# Patient Record
Sex: FEMALE | Race: WHITE | NOT HISPANIC OR LATINO
[De-identification: names, ages, dates, MRNs, and addresses within clinical notes are randomized per-mention and may not be internally consistent; named-entity substitution may affect disease eponyms.]

---

## 2017-02-16 ENCOUNTER — RESULT REVIEW (OUTPATIENT)
Age: 27
End: 2017-02-16

## 2017-06-12 ENCOUNTER — APPOINTMENT (OUTPATIENT)
Dept: ULTRASOUND IMAGING | Facility: IMAGING CENTER | Age: 27
End: 2017-06-12

## 2017-06-12 ENCOUNTER — OUTPATIENT (OUTPATIENT)
Dept: OUTPATIENT SERVICES | Facility: HOSPITAL | Age: 27
LOS: 1 days | End: 2017-06-12
Payer: COMMERCIAL

## 2017-06-12 DIAGNOSIS — Z00.8 ENCOUNTER FOR OTHER GENERAL EXAMINATION: ICD-10-CM

## 2017-06-12 PROCEDURE — 76642 ULTRASOUND BREAST LIMITED: CPT

## 2017-11-29 ENCOUNTER — APPOINTMENT (OUTPATIENT)
Dept: OPHTHALMOLOGY | Facility: CLINIC | Age: 27
End: 2017-11-29
Payer: COMMERCIAL

## 2017-11-29 DIAGNOSIS — H40.033 ANATOMICAL NARROW ANGLE, BILATERAL: ICD-10-CM

## 2017-11-29 DIAGNOSIS — Q13.89: ICD-10-CM

## 2017-11-29 PROCEDURE — 92133 CPTRZD OPH DX IMG PST SGM ON: CPT

## 2017-11-29 PROCEDURE — 92012 INTRM OPH EXAM EST PATIENT: CPT

## 2017-11-29 PROCEDURE — 92285 EXTERNAL OCULAR PHOTOGRAPHY: CPT

## 2017-11-30 PROBLEM — H40.033 ANATOMICAL NARROW ANGLE, BILATERAL: Status: ACTIVE | Noted: 2017-11-30

## 2017-11-30 PROBLEM — Q13.89 PERSISTENT PUPILLARY MEMBRANE: Status: ACTIVE | Noted: 2017-11-30

## 2019-01-16 ENCOUNTER — RESULT REVIEW (OUTPATIENT)
Age: 29
End: 2019-01-16

## 2020-04-26 ENCOUNTER — MESSAGE (OUTPATIENT)
Age: 30
End: 2020-04-26

## 2020-04-29 LAB
SARS-COV-2 IGG SERPL IA-ACNC: 0.1 INDEX
SARS-COV-2 IGG SERPL QL IA: NEGATIVE

## 2020-12-22 ENCOUNTER — OUTPATIENT (OUTPATIENT)
Dept: OUTPATIENT SERVICES | Facility: HOSPITAL | Age: 30
LOS: 1 days | End: 2020-12-22

## 2020-12-22 ENCOUNTER — APPOINTMENT (OUTPATIENT)
Dept: PRIMARY CARE | Facility: HOSPITAL | Age: 30
End: 2020-12-22

## 2020-12-22 VITALS
TEMPERATURE: 98.1 F | HEART RATE: 72 BPM | OXYGEN SATURATION: 100 % | DIASTOLIC BLOOD PRESSURE: 76 MMHG | BODY MASS INDEX: 22.22 KG/M2 | WEIGHT: 150 LBS | SYSTOLIC BLOOD PRESSURE: 122 MMHG | HEIGHT: 69 IN | RESPIRATION RATE: 18 BRPM

## 2020-12-22 DIAGNOSIS — Z20.828 CONTACT WITH AND (SUSPECTED) EXPOSURE TO OTHER VIRAL COMMUNICABLE DISEASES: ICD-10-CM

## 2020-12-22 LAB — SARS-COV-2 N GENE NPH QL NAA+PROBE: NOT DETECTED

## 2021-01-11 NOTE — HISTORY OF PRESENT ILLNESS
[FreeTextEntry8] : 30F NKDA with no PMH and no PSH [facility].\par \par States that  her  was exposed to a friend tested + COVID. She and her  reports no s/s  Denies any fever, cough, sore throat or SOB. No loss of smell or taste, no chest tightness, or any GI related symptoms of nausea, vomiting or diarrhea. 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 Denies any chest pain, no chest palpitations or any respiratory distress\par \par \par \par

## 2021-04-05 ENCOUNTER — OUTPATIENT (OUTPATIENT)
Dept: OUTPATIENT SERVICES | Facility: HOSPITAL | Age: 31
LOS: 1 days | End: 2021-04-05

## 2021-04-05 ENCOUNTER — TRANSCRIPTION ENCOUNTER (OUTPATIENT)
Age: 31
End: 2021-04-05

## 2021-04-05 ENCOUNTER — APPOINTMENT (OUTPATIENT)
Dept: PRIMARY CARE | Facility: HOSPITAL | Age: 31
End: 2021-04-05

## 2021-04-05 VITALS
DIASTOLIC BLOOD PRESSURE: 72 MMHG | OXYGEN SATURATION: 100 % | BODY MASS INDEX: 25.48 KG/M2 | RESPIRATION RATE: 18 BRPM | SYSTOLIC BLOOD PRESSURE: 130 MMHG | HEIGHT: 69 IN | HEART RATE: 68 BPM | WEIGHT: 172 LBS | TEMPERATURE: 98.1 F

## 2021-04-05 DIAGNOSIS — Z20.822 CONTACT WITH AND (SUSPECTED) EXPOSURE TO COVID-19: ICD-10-CM

## 2021-04-05 LAB — SARS-COV-2 N GENE NPH QL NAA+PROBE: NOT DETECTED

## 2021-04-06 LAB
COVID-19 NUCLEOCAPSID  GAM ANTIBODY INTERPRETATION: NEGATIVE
SARS-COV-2 AB SERPL QL IA: 0.08 INDEX

## 2021-04-06 NOTE — HISTORY OF PRESENT ILLNESS
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  Denies any fever, cough, sore throat or SOB. No loss of smell or taste, no chest tightness, or any GI related symptoms of nausea, vomiting or diarrhea. 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 Denies any chest pain, no chest palpitations or any respiratory distress\par \par \par \par

## 2021-04-06 NOTE — PHYSICAL EXAM
[Normal Sclera/Conjunctiva] : normal sclera/conjunctiva [Normal Outer Ear/Nose] : the outer ears and nose were normal in appearance [No JVD] : no jugular venous distention [No Respiratory Distress] : no respiratory distress  [No Accessory Muscle Use] : no accessory muscle use [Clear to Auscultation] : lungs were clear to auscultation bilaterally [Normal Rate] : normal rate  [Normal S1, S2] : normal S1 and S2 [No Murmur] : no murmur heard [No Edema] : there was no peripheral edema [No CVA Tenderness] : no CVA  tenderness [No Joint Swelling] : no joint swelling [No Rash] : no rash [No Focal Deficits] : no focal deficits [Normal Gait] : normal gait [Normal Affect] : the affect was normal [Normal Insight/Judgement] : insight and judgment were intact

## 2021-04-07 DIAGNOSIS — Z20.822 CONTACT WITH AND (SUSPECTED) EXPOSURE TO COVID-19: ICD-10-CM

## 2022-05-10 ENCOUNTER — APPOINTMENT (OUTPATIENT)
Dept: ORTHOPEDIC SURGERY | Facility: CLINIC | Age: 32
End: 2022-05-10
Payer: COMMERCIAL

## 2022-05-10 VITALS — HEIGHT: 69 IN | BODY MASS INDEX: 23.7 KG/M2 | WEIGHT: 160 LBS

## 2022-05-10 DIAGNOSIS — S44.31XA: ICD-10-CM

## 2022-05-10 PROCEDURE — 99204 OFFICE O/P NEW MOD 45 MIN: CPT

## 2022-05-10 NOTE — HISTORY OF PRESENT ILLNESS
[de-identified] : Patient had first time dislocation on 4/29/22 s/p slip and fall where she caught herself with her right arm. shoulder was reduced 5 hours later at the hospital. There was second instability episode directly after reduction. Patient wore sling for two days but was advised to stop. Patient reports dysesthesias of the lateral arm at this time. Patient had initial weakness of the shoulder but her strength has retuned at this time. Patient still has apprehension. The patient is 5 months pregnant.

## 2022-05-10 NOTE — DISCUSSION/SUMMARY
[de-identified] : The patient has right shoulder s/p first time anterior dislocation on 4/29/22 with possible axillary nerve injury \par \par Due to the possible injury to the axillary nerve, and her status of pregnancy, the patient would not be a candidate for surgery at this time\par The patient was prescribed  supervised PT for the right shoulder \par The patient will follow up after the delivery of her baby\par The patient will follow up in 6 weeks\par If the patient has repeated instability events or is considering surgical intervention, we would need to order a CT arthrogram of the right shoulder to evaluate the more detailed shoulder pathology \par \par There was independent review of outside x-rays today. \par \par I, Dr. Jose Cary, attest that this documentation was recorded by the scribe, in my presence, and that it accurately reflects the service I personally performed, and the decisions made by me with my edits as appropriate.\par \par I, Jovon Lutz, acting as scribe, attest that this documentation has been prepared under the direction and in the presence of Provider Jose Cary MD.\par

## 2022-05-10 NOTE — REASON FOR VISIT
[FreeTextEntry2] : The patient is being seen here today as a new patient for an evaluation of her right shoulder pain s/p. First time dislocation on 4/29/2022.

## 2022-05-10 NOTE — PHYSICAL EXAM
[4___] : abduction 4[unfilled]/5 [5___] : internal rotation 5[unfilled]/5 [Right] : right shoulder [de-identified] : Constitutional: The general appearance of the patient is well developed, well nourished, no deformities and well groomed. Normal\par \par  Gait: Gait and function is as follows: normal gait.\par \par  Skin: Head and neck visualized skin is normal. Left upper extremity visualized skin is normal. Right upper extremity visualized skin is normal. \par \par  Cardiovascular: palpable radial pulse bilaterally.\par \par  Neurologic: The patient is oriented to time, place and person. Sensation to light touch intact in the bilateral upper extremities. Mood and Affect is normal.\par  [] : negative Bill [FreeTextEntry8] : supra TTP. [FreeTextEntry9] : IR T8.  [de-identified] : supra 4/5. [de-identified] : +apprehension [de-identified] : axillary patch distribution decreased RUE. [FreeTextEntry1] : 4/29/22 [facility]: anterior inferior GH joint dislocation with successful anatomic relocation. no evidence of fracture. [TWNoteComboBox7] : active forward flexion 150 degrees [de-identified] : active abduction 90 degrees [de-identified] : external rotation 50 degrees

## 2024-06-19 ENCOUNTER — APPOINTMENT (OUTPATIENT)
Dept: ORTHOPEDIC SURGERY | Facility: CLINIC | Age: 34
End: 2024-06-19
Payer: COMMERCIAL

## 2024-06-19 DIAGNOSIS — S43.004A UNSPECIFIED DISLOCATION OF RIGHT SHOULDER JOINT, INITIAL ENCOUNTER: ICD-10-CM

## 2024-06-19 DIAGNOSIS — M25.511 PAIN IN RIGHT SHOULDER: ICD-10-CM

## 2024-06-19 PROCEDURE — 99214 OFFICE O/P EST MOD 30 MIN: CPT

## 2024-06-19 NOTE — DISCUSSION/SUMMARY
[de-identified] : RUE: neg apprehension with ER no strength deficit  This is a 34yo female presenting to the office c/o ongoing right shoulder pain after a recent dislocation. MRI demonstrates labrum tear Patient has no feeling of instability  Patient does not participate in any contact sports or Cross[employer] like activities. She is  currently asymptomatic therefore recommended observation and possible rotator cuff strengthening to increase stability however patient does not feel this is necessary at this point.  I did discuss that surgery would be an option if she did have a recurrent dislocation.  Based on the imaging, would favor arthroscopic labral repair with possible remplissage.  Would refer to Dr. Jin for surgical intervention if needed.  (1) We discussed a comprehensive treatment plans that included a prescription management plan involving the use of prescription strength medications to include Ibuprofen 600-800 mg TID, versus 500-650 mg Tylenol. We also discussed prescribing topical diclofenac (Voltaren gel) as well as once daily Meloxicam 15 mg. (2) The patient has More Than One chronic injuries/illnesses as outlined, discussed, and documented by ICD 10 codes listed, as well as the HPI and Plan section. There is a moderate risk of morbidity with further treatment, especially from use of prescription strength medications and possible side effects of these medications which include upset stomach and cardiac/renal issues with long term use were discussed. (3) I recommended that the patient follow-up with their medical physician to discuss any significant specific potential issues with long term use such as interactions with current medications or with exacerbation of underlying medical morbidities.   Attestation: I, Shakira Thompson , attest that this documentation has been prepared under the direction and in the presence of Provider Cale Richard MD. The documentation recorded by the scribe, in my presence, accurately reflects the service I personally performed, and the decisions made by me with my edits as appropriate. Cale Richard MD

## 2024-06-19 NOTE — DATA REVIEWED
Nutrition consult.   Supplements with meals.   [FreeTextEntry1] : MRI Arthrogram Right shoulder ZPR  -[place] no significant bone loss -Circumferential labral tear, nondisplaced SLAP, chronic anterior inferior labrum scarred medially to the neck.

## 2024-06-19 NOTE — HISTORY OF PRESENT ILLNESS
[de-identified] : This is a 34yo female presenting to the office c/o ongoing right shoulder pain after a recent dislocation. Pt was able to reduce the shoulder herself. Patient had a first time dislocation on 4/29/22. Pain is described as constant, severe in quality. Currently pain is 7/10 and non-radiating. Patient reports pain has been getting progressively worse. Pain is worse at night. Overall pain does improve with rest and ice. Patient denies any numbness or tingling. Pt is LHD ~Patient referred by Dr. Cary since she is a personal friend.  Patient reports 2 months ago a second time dislocation event there was able to be reduced spontaneously.  She saw another orthopedist who recommended an MRI arthrogram and she is here for second opinion.  She denies any symptoms of instability weakness or persistent pain.

## 2024-06-19 NOTE — PHYSICAL EXAM
[de-identified] : Constitutional: The general appearance of the patient is well developed, well nourished, no deformities and well groomed. Normal   Gait: Gait and function is as follows: normal gait.   Skin: Head and neck visualized skin is normal. Left upper extremity visualized skin is normal. Right upper extremity visualized skin is normal. Thoracic Skin of the thoracic spine shows visualized skin is normal.   Cardiovascular: palpable radial pulse bilaterally, good capillary refill in digits of the bilateral upper extremities and no temperature or color changes in the bilateral upper extremities.   Lymphatic: Normal Palpation of lymph nodes in the cervical.   Neurologic: fine motor control in the bilateral upper extremities is intact. Deep Tendon Reflexes in Upper and Lower Extremities Negative Llamas's in the bilateral upper extremities. The patient is oriented to time, place and person. Sensation to light touch intact in the bilateral upper extremities. Mood and Affect is normal.   Right Shoulder: Inspection of the shoulder/upper arm is as follows: no scapula winging, no biceps deformity and no AC joint deformity. Palpation of the shoulder/upper arm is as follows: There is tenderness at the proximal biceps tendon. Range of motion of the shoulder is as follows: Pain with internal rotation, external rotation, abduction and forward flexion. Strength of the shoulder is as follows: Supraspinatus 4/5. External Rotation 4/5. Internal Rotation 4/5. Deltoid 5/5 Ligament Stability and Special Tests of the shoulder is as follows: Neer test is positive. [place]' test is positive. Speed's test is positive   Left Shoulder: Inspection of the shoulder/upper arm is as follows: There is mild pain with range of motion of the shoulder range of motion of the shoulder    Neck:   Inspection / Palpation of the cervical spine is as follows: mild paracervical tenderness. Range of motion of the cervical spine is as follows: moderately decreased range of motion of the cervical spine. Stability testing for the cervical spine is as follows Stable range of motion.   Back, including spine: Inspection / Palpation of the thoracic/lumbar spine is as follows: There is a full, pain free, stable range of motion of the thoracic spine with a normal tone and not tenderness to palpation..

## 2025-06-14 ENCOUNTER — NON-APPOINTMENT (OUTPATIENT)
Age: 35
End: 2025-06-14

## 2025-06-16 ENCOUNTER — APPOINTMENT (OUTPATIENT)
Dept: GASTROENTEROLOGY | Facility: CLINIC | Age: 35
End: 2025-06-16
Payer: COMMERCIAL

## 2025-06-16 VITALS
WEIGHT: 136 LBS | HEIGHT: 68 IN | SYSTOLIC BLOOD PRESSURE: 110 MMHG | HEART RATE: 53 BPM | DIASTOLIC BLOOD PRESSURE: 60 MMHG | OXYGEN SATURATION: 95 % | BODY MASS INDEX: 20.61 KG/M2 | RESPIRATION RATE: 16 BRPM

## 2025-06-16 PROCEDURE — 99205 OFFICE O/P NEW HI 60 MIN: CPT

## 2025-06-23 LAB
IGA SERPL-MCNC: 154 MG/DL
PROMETHEUS CELIAC GENETICS: NORMAL
PROMETHEUS LABORATORY FOOTER: NORMAL
TTG IGA SER IA-ACNC: <0.5 U/ML
TTG IGA SER-ACNC: NEGATIVE
TTG IGG SER IA-ACNC: <0.8 U/ML
TTG IGG SER IA-ACNC: NEGATIVE